# Patient Record
Sex: FEMALE | NOT HISPANIC OR LATINO | URBAN - METROPOLITAN AREA
[De-identification: names, ages, dates, MRNs, and addresses within clinical notes are randomized per-mention and may not be internally consistent; named-entity substitution may affect disease eponyms.]

---

## 2023-10-16 ENCOUNTER — LAB (OUTPATIENT)
Dept: LAB | Facility: LAB | Age: 48
End: 2023-10-16
Payer: MEDICAID

## 2023-10-16 ENCOUNTER — HOSPITAL ENCOUNTER (OUTPATIENT)
Dept: RADIOLOGY | Facility: HOSPITAL | Age: 48
Discharge: HOME | End: 2023-10-16
Payer: MEDICAID

## 2023-10-16 DIAGNOSIS — G95.9 DISEASE OF SPINAL CORD, UNSPECIFIED (MULTI): Primary | ICD-10-CM

## 2023-10-16 DIAGNOSIS — G95.9 DISEASE OF SPINAL CORD, UNSPECIFIED (MULTI): ICD-10-CM

## 2023-10-16 LAB
ERYTHROCYTE [SEDIMENTATION RATE] IN BLOOD BY WESTERGREN METHOD: 1 MM/H (ref 0–20)
EST. AVERAGE GLUCOSE BLD GHB EST-MCNC: 100 MG/DL
HBA1C MFR BLD: 5.1 %
TSH SERPL DL<=0.05 MIU/L-ACNC: 0.83 MIU/L (ref 0.27–4.2)
VIT B12 SERPL-MCNC: 353 PG/ML (ref 211–946)

## 2023-10-16 PROCEDURE — 36415 COLL VENOUS BLD VENIPUNCTURE: CPT

## 2023-10-16 PROCEDURE — 72141 MRI NECK SPINE W/O DYE: CPT

## 2023-10-16 PROCEDURE — 84207 ASSAY OF VITAMIN B-6: CPT

## 2023-10-16 PROCEDURE — 83036 HEMOGLOBIN GLYCOSYLATED A1C: CPT

## 2023-10-16 PROCEDURE — 84443 ASSAY THYROID STIM HORMONE: CPT

## 2023-10-16 PROCEDURE — 82607 VITAMIN B-12: CPT

## 2023-10-16 PROCEDURE — 85652 RBC SED RATE AUTOMATED: CPT

## 2023-10-21 LAB — PYRIDOXAL PHOS SERPL-SCNC: 28.1 NMOL/L (ref 20–125)

## 2024-02-05 ENCOUNTER — APPOINTMENT (OUTPATIENT)
Dept: NEUROSURGERY | Facility: CLINIC | Age: 49
End: 2024-02-05
Payer: COMMERCIAL

## 2024-03-15 ENCOUNTER — HOSPITAL ENCOUNTER (OUTPATIENT)
Dept: RADIOLOGY | Facility: HOSPITAL | Age: 49
Discharge: HOME | End: 2024-03-15
Payer: COMMERCIAL

## 2024-03-15 DIAGNOSIS — M47.26 OTHER SPONDYLOSIS WITH RADICULOPATHY, LUMBAR REGION: ICD-10-CM

## 2024-03-15 DIAGNOSIS — M51.26 OTHER INTERVERTEBRAL DISC DISPLACEMENT, LUMBAR REGION: ICD-10-CM

## 2024-03-15 DIAGNOSIS — M51.36 OTHER INTERVERTEBRAL DISC DEGENERATION, LUMBAR REGION: ICD-10-CM

## 2024-03-15 DIAGNOSIS — M48.07 SPINAL STENOSIS, LUMBOSACRAL REGION: ICD-10-CM

## 2024-03-15 PROCEDURE — 72148 MRI LUMBAR SPINE W/O DYE: CPT

## 2024-03-15 PROCEDURE — 72148 MRI LUMBAR SPINE W/O DYE: CPT | Performed by: RADIOLOGY

## 2024-04-22 ENCOUNTER — OFFICE VISIT (OUTPATIENT)
Dept: NEUROLOGY | Facility: CLINIC | Age: 49
End: 2024-04-22
Payer: COMMERCIAL

## 2024-04-22 VITALS
BODY MASS INDEX: 38.34 KG/M2 | SYSTOLIC BLOOD PRESSURE: 130 MMHG | HEIGHT: 68 IN | WEIGHT: 253 LBS | DIASTOLIC BLOOD PRESSURE: 86 MMHG | HEART RATE: 66 BPM

## 2024-04-22 DIAGNOSIS — R20.9 SENSORY DISTURBANCE: ICD-10-CM

## 2024-04-22 DIAGNOSIS — R26.81 GAIT INSTABILITY: ICD-10-CM

## 2024-04-22 DIAGNOSIS — G56.01 RIGHT CARPAL TUNNEL SYNDROME: ICD-10-CM

## 2024-04-22 DIAGNOSIS — G89.29 CHRONIC MIDLINE LOW BACK PAIN WITH BILATERAL SCIATICA: Primary | ICD-10-CM

## 2024-04-22 DIAGNOSIS — M54.42 CHRONIC MIDLINE LOW BACK PAIN WITH BILATERAL SCIATICA: Primary | ICD-10-CM

## 2024-04-22 DIAGNOSIS — M54.41 CHRONIC MIDLINE LOW BACK PAIN WITH BILATERAL SCIATICA: Primary | ICD-10-CM

## 2024-04-22 PROCEDURE — 99205 OFFICE O/P NEW HI 60 MIN: CPT | Performed by: PSYCHIATRY & NEUROLOGY

## 2024-04-22 RX ORDER — METOPROLOL TARTRATE 50 MG/1
50 TABLET ORAL 2 TIMES DAILY
COMMUNITY

## 2024-04-22 RX ORDER — GABAPENTIN 600 MG/1
600 TABLET ORAL 3 TIMES DAILY
COMMUNITY

## 2024-04-22 RX ORDER — TRIAMTERENE AND HYDROCHLOROTHIAZIDE 75; 50 MG/1; MG/1
1 TABLET ORAL DAILY
COMMUNITY
Start: 2018-04-11

## 2024-04-22 RX ORDER — OMEGA-3 FATTY ACIDS/FISH OIL 340-1000MG
2 CAPSULE ORAL
COMMUNITY
Start: 2022-09-06

## 2024-04-22 NOTE — PROGRESS NOTES
Subjective     Desi Lewis is a 48 y.o. year old right handed female presenting as a new patient for low back pain with bilateral sciatica, numbness and tingling in hands and feet, numbness of torso, gait/balance difficulty.    HPI    She has past medical history significant for hypertension, rheumatoid arthritis, asthma, former smoking, C4-6 ACDF in 2019, chronic low back pain.    She is evaluated in the office today as a new patient referred by her PCP Dr. Juan Shaw, she is accompanied by her son.    She presents with a number of complaints and concerns.    She has noted chronic bilateral low back pain for at least the past 8-9 years associated with radiation to both lower extremities.  Her lower extremity radicular pain tends to be worse on the left.  She indicates initially that her pain is worse when she is lying supine, but subsequently indicates that she has pain in any position.  Her pain limits her walking and standing endurance but she cannot be specific about quantifying this.    She additionally complains of numbness and tingling in all distal extremities.  This involves the fingertips of both hands without preference for particular fingers, and the toes of the left more than right foot.  It is described as constant rather than intermittent and has been particularly pronounced over the past 3 months.    Also over the past 3 months she has begun noting a persistent numbness sensation around the torso (in a band encompassing the umbilicus and in the regions above and just below).  This involves the anterior and posterior abdominal regions without side preference.    She perceives  weakness as well as weakness of her legs.  She indicates ordinarily using a walker although presents today without assistive devices.  At home she occasionally holds onto walls or furniture.    She denies bladder/bowel dysfunction except for exacerbation of back pain when she pushes to have a bowel movement.  She  does not perceive incomplete bladder emptying.    She feels a constant need to stretch her muscles which is worse when she is in bed at night.  However, stretching does not provide much relief.    In addition to radicular pain in the left lower extremity she notes burning discomfort focally over the dorsum of the left foot.    I reviewed neuroimaging.  Lumbar spine MRI from 3/15/2024 shows grade 1 anterolisthesis of L4 on L5 and slight anterolisthesis of L3 on L4, both new compared to a prior study from 2011.  There is moderate canal stenosis at L3/4 and L4/5.  There is multilevel foraminal stenosis which is severe on the right at L3/4 and L4/5, moderate to severe on the left at L3/4 and L5/S1.    Cervical spine MRI from 10/16/2023 shows status post fusion at C4-C6 with decompressed canal at those levels.  Focus of signal hyperintensity within the left lateral aspect of the cord at C4/5 presumably representing myelomalacia.  There is compression of the cord above the level of the fusion at C3/4 with subtle cord hyperintensity at this level representing myelomalacia versus edema.    I reviewed the report of EMG and nerve conduction studies done by Dr. Gio Crawley on 2/3/2021 evaluating both upper extremities.  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    With regard to pain management she is currently taking gabapentin 600 mg 3 times daily without obvious relief.  She sees a Pain Management specialist and has received injections, including a recent lumbar injection a couple of weeks ago with limited relief thus far.  She was prescribed topiramate but did not like it.  It sounds as if she has also taken oral steroid courses.    She has not been splinting recently for the right carpal tunnel syndrome.    Review of Systems    Review of Systems:  Neurologic:   As per the history of present illness.  Constitutional:  Negative for fevers, chills, or weight loss, positive for fatigue.  Musculoskeletal: Positive for neck and back pain. Cardiovascular:  Negative for chest pain or palpitations.  Respiratory:  Negative for dyspnea.  Eyes:  Negative for vision loss or diplopia.  ENT:  Negative for hearing loss, positive for tinnitus.  GI:  Negative for bowel incontinence.  :  Negative for bladder incontinence.  Dermatologic: Positive for rash.        There is no problem list on file for this patient.    Past Medical History:   Diagnosis Date    Anxiety disorder, unspecified     Anxiety     Past Surgical History:   Procedure Laterality Date    OTHER SURGICAL HISTORY  02/27/2014    Excision Lesion Of Abdominal Wall     Social History     Tobacco Use    Smoking status: Not on file    Smokeless tobacco: Not on file   Substance Use Topics    Alcohol use: Not on file     family history is not on file.  No current outpatient medications on file.  Not on File    Objective   Neurological Exam  Physical Exam    Physical Examination:    General: Alert woman who was ambulatory without assistive devices.      Cardiovascular: Warm hands with strong symmetric radial pulses.  Warm feet.    Mental Status: Clear sensorium without fluctuation.  Appropriate and oriented in conversation.  Recent and remote recall intact for details of medical history.  Attention and concentration intact during interview.  Fund of knowledge fair for medical information.  Language intact and fluent without paraphasic errors.    Cranial Nerves:  Funduscopic exam was not well visualized bilaterally on nondilated exam.  Pupils were equal, round and reactive to light with no relative afferent pupillary defect.  Extraocular movements were intact and conjugate without nystagmus.  No ptosis.  Visual fields were full to confrontation tested binocularly.  Facial sensation was symmetric to pin.  Facial motor function was  symmetrically intact.  Hearing was grossly intact.  No dysarthria.  Shoulder shrug was symmetric.  Tongue protrusion was midline.    Motor: Muscle bulk and tone were normal throughout. There was no pronator drift or asymmetry of finger taps. Confrontation strength was symmetrically 5/5 throughout the upper and lower extremities with the following exceptions: Middle deltoid and triceps were 4 right and 4+ left, and left hip flexion was 4-4+ with moderately reduced range compared to the right.     Coordination: Finger to finger and alternating hand movements were rapid and accurate without dysmetria. There was no tremor, myoclonus or dystonic posturing.    Tendon Reflexes: Symmetrically 2-3+ brachioradialis, 2+ triceps, absent patellar, 2+ ankles, neutral plantars.  Biceps was 3+ right and 2-3+ left.  Finger flexors were quite hyperactive bilaterally.    Sensation: Pin was symmetrically sharp over the volar fingertips.  Pin was sharp over the toes of the right foot, dull over the left toes.  Vibration thresholds were normal at the great toes and index fingers.  Romberg sign was absent.    Station: Intact and stable.    Gait: Stable without assistance and notable for a limping appearance, as well as question of left longer than right leg length discrepancy.  She needed to hold onto furniture or the wall to perform tandem.    Other: Lhermitte sign was absent.  Seated straight leg raise maneuver was positive bilaterally.      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    I would like her to see spinal surgery regarding the C3/4 compression sooner than her scheduled appointment which she indicates is in August.  I will contact her surgeon to see whether this can possibly be expedited.  In  the meantime I reviewed cervical spine precautions with her and discussed the risk of central cord syndrome with sudden hyperextension.    I discussed that most likely her upper extremity complaints relate primarily to cervical spondylotic myelopathy, but there may be a contribution from right median neuropathy at the wrist and I recommended she night splint.  I provided a prescription for a neutral rigid splint which I advised her to wear overnight on the right wrist.    Given her new complaint over the past few months of numbness around the torso, I recommended proceeding with a thoracic spine MRI.  I will contact her with findings.    I advised her to continue working with pain management.    She will continue using a walker as she sees fit to reduce risk of falls.  She does walk stably today without assistance albeit over a short distance.    We will determine further evaluation and management steps and timing of her next office visit after her thoracic spine MRI is completed.

## 2024-04-22 NOTE — PATIENT INSTRUCTIONS
As we discussed, I recommend an MRI of the thoracic spine to evaluate the new numbness over your torso over the past 3 months.  I will call with results.    Your most recent cervical spine MRI from October 2023 shows compression of the spinal cord at the C3/4 level which is just above your fusion.  This is likely responsible for the numbness and tingling in your hands and weakness in your hands and arms.  It may also affect your balance.  You should discuss this further with Dr. Roy, and I will contact his office to see whether your appointment can be expedited given the appearance of the cervical spine MRI.    We reviewed cervical spine precautions.  You should be careful to keep the headrest when you are driving or riding in a car, in a position that protects you from your head whipping back should the car stop suddenly or be hit from behind.  You should avoid activities that put you at risk for falling with head trauma, such as climbing on ladders.    I recommend you continue working with pain management on symptom control.  Since you had a recent injection, we discussed contacting your pain management specialist about whether a second injection may provide additional benefit.    Continue using a walker or cane as you see fit to reduce risk of falls.    We will determine timing of your next office visit after the thoracic spine MRI is completed.

## 2024-05-07 ENCOUNTER — OFFICE VISIT (OUTPATIENT)
Dept: NEUROSURGERY | Facility: CLINIC | Age: 49
End: 2024-05-07
Payer: COMMERCIAL

## 2024-05-07 VITALS
WEIGHT: 250 LBS | BODY MASS INDEX: 37.89 KG/M2 | RESPIRATION RATE: 20 BRPM | HEIGHT: 68 IN | SYSTOLIC BLOOD PRESSURE: 122 MMHG | HEART RATE: 78 BPM | DIASTOLIC BLOOD PRESSURE: 70 MMHG

## 2024-05-07 DIAGNOSIS — M48.061 SPINAL STENOSIS OF LUMBAR REGION WITHOUT NEUROGENIC CLAUDICATION: Primary | ICD-10-CM

## 2024-05-07 PROCEDURE — 99212 OFFICE O/P EST SF 10 MIN: CPT | Performed by: NEUROLOGICAL SURGERY

## 2024-05-07 ASSESSMENT — ENCOUNTER SYMPTOMS
NUMBNESS: 1
HEMATOLOGIC/LYMPHATIC NEGATIVE: 1
FATIGUE: 1
PSYCHIATRIC NEGATIVE: 1
GASTROINTESTINAL NEGATIVE: 1
RESPIRATORY NEGATIVE: 1
ACTIVITY CHANGE: 1
DIZZINESS: 1
LIGHT-HEADEDNESS: 1
WEAKNESS: 1
NECK PAIN: 1
BACK PAIN: 1
ENDOCRINE NEGATIVE: 1

## 2024-05-07 ASSESSMENT — PAIN SCALES - GENERAL: PAINLEVEL: 8

## 2024-05-07 NOTE — PROGRESS NOTES
"9-19-19 C4-6 ACDF. Here today with ache pain in the neck. Having burning stabbing pain in the back and down left greater than right leg and numbness and tingling. Has had injection, no PT.    48-year-old woman who previously underwent anterior cervical discectomy and fusion with donor bone and plating returns because of severe low back pain that is radiating down her left leg.  She has not had she any relief from injections or medications.  The pain radiates from her back down her left leg all the way to her foot.    Review of Systems   Constitutional:  Positive for activity change and fatigue.   HENT: Negative.     Eyes:  Positive for visual disturbance.   Respiratory: Negative.     Cardiovascular:  Positive for leg swelling.   Gastrointestinal: Negative.    Endocrine: Negative.    Genitourinary: Negative.    Musculoskeletal:  Positive for back pain and neck pain.   Skin: Negative.    Allergic/Immunologic: Positive for environmental allergies and food allergies.   Neurological:  Positive for dizziness, weakness, light-headedness and numbness.   Hematological: Negative.    Psychiatric/Behavioral: Negative.         Visit Vitals  /70   Pulse 78   Resp 20   Ht 1.727 m (5' 8\")   Wt 113 kg (250 lb)   BMI 38.01 kg/m²   Smoking Status Former   BSA 2.33 m²           Current Outpatient Medications:     ALBUTEROL INHL, Albuterol, Disp: , Rfl:     gabapentin (Neurontin) 600 mg tablet, Take 1 tablet (600 mg) by mouth 3 times a day., Disp: , Rfl:     metoprolol tartrate (Lopressor) 50 mg tablet, Take 1 tablet by mouth 2 times a day., Disp: , Rfl:     omega-3 fatty acids-fish oil (Fish OiL) 340-1,000 mg capsule, Take 2 capsules by mouth once daily., Disp: , Rfl:     triamterene-hydrochlorothiazid (Maxzide) 75-50 mg tablet, Take 1 tablet by mouth once daily., Disp: , Rfl:       Objective   Neurological Exam    On physical exam, the patient is alert and interactive.  Her spine is nontender.  Strength is full throughout.  Her " reflexes are hypoactive throughout.    An MRI of the lumbar spine that I personally reviewed demonstrates multilevel degenerative changes.  At L3-4 there is a bulging disc that results in some bilateral foraminal stenosis.  At L4-5 there is a grade 1 spondylolisthesis and moderate to severe canal stenosis.  At L5-S1 there is a left greater than right disc bulge with left lateral recess stenosis.  In her cervical spine, post operative changes are seen at the C4-6 levels.  There is a broad-based disc bulge at C3-4 that results in moderate canal stenosis and early deformation of the spinal cord.    The patient has severe spondylosis with spondylolisthesis in her lumbar spine.  She has not had relief from nonoperative therapy.  To address this, I have offered her an L4-S1 decompressive laminectomy and fusion with rods, screws, and donor bone.  We reviewed the risks and benefits of the procedure today.  Specifically, we discussed that surgery would be a last resort for severe, debilitating pain that is not treatable by any nonoperative means.  The patient will contact my office when she makes a decision about surgery.

## 2024-07-12 ENCOUNTER — EVALUATION (OUTPATIENT)
Dept: PHYSICAL THERAPY | Facility: CLINIC | Age: 49
End: 2024-07-12
Payer: COMMERCIAL

## 2024-07-12 DIAGNOSIS — M48.061 SPINAL STENOSIS OF LUMBAR REGION WITHOUT NEUROGENIC CLAUDICATION: ICD-10-CM

## 2024-07-12 DIAGNOSIS — M54.50 LOW BACK PAIN, UNSPECIFIED BACK PAIN LATERALITY, UNSPECIFIED CHRONICITY, UNSPECIFIED WHETHER SCIATICA PRESENT: Primary | ICD-10-CM

## 2024-07-12 DIAGNOSIS — M51.26 LUMBAR DISC HERNIATION: ICD-10-CM

## 2024-07-12 PROCEDURE — 97162 PT EVAL MOD COMPLEX 30 MIN: CPT | Mod: GP | Performed by: PHYSICAL THERAPIST

## 2024-07-12 SDOH — ECONOMIC STABILITY: GENERAL: QUALITY OF LIFE: FAIR

## 2024-07-12 ASSESSMENT — ENCOUNTER SYMPTOMS
PAIN LOCATION: LUMBAR SPINE
ALLEVIATING FACTORS: HEAT
PAIN SCALE AT LOWEST: 6
PAIN AT NIGHT: 1
QUALITY: BURNING
PAIN SCALE: 8
EXACERBATED BY: MOVEMENT
PAIN SCALE AT HIGHEST: 10

## 2024-07-12 ASSESSMENT — ACTIVITIES OF DAILY LIVING (ADL)
AMBULATION_WITHOUT_ASSISTIVE_DEVICE: INDEPENDENT
POOR_BALANCE: 1

## 2024-07-12 NOTE — PROGRESS NOTES
"Physical Therapy Evaluation and Treatment     Patient Name: Desi Lewis  MRN: [id number]  Visit Date: 2024  Time Calculation  Start Time: 09  Stop Time: 1010  Time Calculation (min): 35 min  PT Evaluation Time Entry  PT Evaluation (Moderate) Time Entry: 35    Visit # 1 of 9  Visits/Dates Authorized: NO AUTH / 60 V/yr - 0 USED / $30 COPAY / OOP $3000 - $856 met     Current Problem:   Problem List Items Addressed This Visit             ICD-10-CM    Spinal stenosis of lumbar region without neurogenic claudication M48.061    Relevant Orders    Follow Up In Physical Therapy    Lumbar disc herniation M51.26    Relevant Orders    Follow Up In Physical Therapy    Low back pain - Primary M54.50    Relevant Orders    Follow Up In Physical Therapy     Precautions:  Precautions  Precautions Comment: See MRI results    Subjective Evaluation    History of Present Illness  Mechanism of injury: Pt reports to therapy with LBP and would like to try somethign else besides surgery. She reports reduced mobiltiy and has had a couple falls. Her most recent fall was about 2 months ago which exacerbated the back pain; she reports she was walking her dog and \"just lost her balance.\" Difficulty with sleep, wakes her up at night     Quality of life: fair    Pain  Current pain ratin  At best pain ratin  At worst pain rating: 10  Location: Lumbar spine  Quality: burning  Relieving factors: heat  Aggravating factors: movement  Progression: worsening    Patient Goals  Patient goals for therapy: decreased pain, improved balance, increased strength, increased motion and independence with ADLs/IADLs           Objective     Special Questions  Patient is experiencing night pain.     Static Posture   General Observations  Symmetrical weight bearing and guarded.     Lumbar Spine   Flattened.     Postural Observations  Seated posture: fair  Standing posture: fair      Palpation   Left   Muscle spasm in the iliopsoas and lumbar " paraspinals.   Tenderness of the iliopsoas and lumbar paraspinals.   Trigger point to iliopsoas and lumbar paraspinals.     Right   Muscle spasm in the lumbar paraspinals. Tenderness of the iliopsoas.   Trigger point to lumbar paraspinals.     Additional Palpation Details  Large amount of guarding throughout lumbar spine musculature    Active Range of Motion     Lumbar   Normal active range of motion  Flexion: WFL and with pain  Extension: WFL and with pain  Left lateral flexion: WFL and with pain  Right lateral flexion: WFL and with pain    Strength/Myotome Testing     Left Hip   Planes of Motion   Flexion: 4+  Extension: 4+  Abduction: 4+  Adduction: 4+    Right Hip   Planes of Motion   Flexion: 4  Abduction: 4+  Adduction: 4+    Additional Strength Details  Screened in sitting - pt unable to achieve supine position     Ambulation     Ambulation: Level Surfaces   Ambulation without assistive device: independent    Ambulation: Stairs   Ascend stairs: independent  Pattern: reciprocal  Railings: one rail  Descend stairs: independent  Pattern: non-reciprocal  Railings: two rails    Observational Gait   Gait: antalgic   Walking speed, stride length, left stance time and right stance time within functional limits.   Base of support: normal    Additional Observational Gait Details  Slight trendelenburg        Objective     Special Questions  Patient is experiencing night pain.     Static Posture   General Observations  Symmetrical weight bearing and guarded.     Lumbar Spine   Flattened.     Postural Observations  Seated posture: fair  Standing posture: fair      Palpation   Left   Muscle spasm in the iliopsoas and lumbar paraspinals.   Tenderness of the iliopsoas and lumbar paraspinals.   Trigger point to iliopsoas and lumbar paraspinals.     Right   Muscle spasm in the lumbar paraspinals. Tenderness of the iliopsoas.   Trigger point to lumbar paraspinals.     Additional Palpation Details  Large amount of guarding  throughout lumbar spine musculature    Active Range of Motion     Lumbar   Normal active range of motion  Flexion: WFL and with pain  Extension: WFL and with pain  Left lateral flexion: WFL and with pain  Right lateral flexion: WFL and with pain    Strength/Myotome Testing     Left Hip   Planes of Motion   Flexion: 4+  Extension: 4+  Abduction: 4+  Adduction: 4+    Right Hip   Planes of Motion   Flexion: 4  Abduction: 4+  Adduction: 4+    Additional Strength Details  Screened in sitting - pt unable to achieve supine position     Ambulation     Ambulation: Level Surfaces   Ambulation without assistive device: independent    Ambulation: Stairs   Ascend stairs: independent  Pattern: reciprocal  Railings: one rail  Descend stairs: independent  Pattern: non-reciprocal  Railings: two rails    Observational Gait   Gait: antalgic   Walking speed, stride length, left stance time and right stance time within functional limits.   Base of support: normal    Additional Observational Gait Details  Slight trendelenburg        Objective     Special Questions  Patient is experiencing night pain.     Static Posture   General Observations  Symmetrical weight bearing and guarded.     Lumbar Spine   Flattened.     Postural Observations  Seated posture: fair  Standing posture: fair      Palpation   Left   Muscle spasm in the iliopsoas and lumbar paraspinals.   Tenderness of the iliopsoas and lumbar paraspinals.   Trigger point to iliopsoas and lumbar paraspinals.     Right   Muscle spasm in the lumbar paraspinals. Tenderness of the iliopsoas.   Trigger point to lumbar paraspinals.     Additional Palpation Details  Large amount of guarding throughout lumbar spine musculature    Active Range of Motion     Lumbar   Normal active range of motion  Flexion: WFL and with pain  Extension: WFL and with pain  Left lateral flexion: WFL and with pain  Right lateral flexion: WFL and with pain    Strength/Myotome Testing     Left Hip   Planes of  Motion   Flexion: 4+  Extension: 4+  Abduction: 4+  Adduction: 4+    Right Hip   Planes of Motion   Flexion: 4  Abduction: 4+  Adduction: 4+    Additional Strength Details  Screened in sitting - pt unable to achieve supine position     Ambulation     Ambulation: Level Surfaces   Ambulation without assistive device: independent    Ambulation: Stairs   Ascend stairs: independent  Pattern: reciprocal  Railings: one rail  Descend stairs: independent  Pattern: non-reciprocal  Railings: two rails    Observational Gait   Gait: antalgic   Walking speed, stride length, left stance time and right stance time within functional limits.   Base of support: normal    Additional Observational Gait Details  Slight trendelenburg      Treatments:  Therapeutic Exercise  Therapeutic Exercise Performed: Yes  Therapeutic Exercise Activity 1: Access Code [email or web address]  Therapeutic Activity  Therapeutic Activity Performed: Yes  Therapeutic Activity 1: Educated on anatomy in relation to current findings  - Seated Diaphragmatic Breathing  - 1-2 x daily - 7 x weekly - 3 sets - 10 reps  - Seated Thoracic Lumbar Extension  - 1-2 x daily - 7 x weekly - 3 sets - 10 reps  - Standing Thoracic Extension at Wall  - 1-2 x daily - 7 x weekly - 3 sets - 10 reps  - Prone Press Up On Elbows  - 1-2 x daily - 7 x weekly - 3 sets - 10 reps      Assessment & Plan     Assessment  Impairments: abnormal gait, abnormal or restricted ROM, activity intolerance, impaired balance, impaired physical strength, lacks appropriate home exercise program and pain with function  Assessment details: Pt is a 48 y.o female presenting to therapy with LBP acute reoccurrence. Pt demonstrated grossly normal limits of mobility however pain throughout lumbar ROM due to muscle guarding and pain. Tenderness and restriction throughout B lumbar appears to be greater L > R. Pt appears to have potential both disc and lumbar muscle involvement which could both be creating tension on neural  structures and reported symptoms. At this time pt would benefit from skilled physical therapy in order to prevent further functional decline.   Barriers to therapy: Chronic nature  Prognosis: good  Prognosis details: Pt very willing to prevent surgery and try conservative methods    Plan  Therapy options: will be seen for skilled physical therapy services  Planned modality interventions: cryotherapy, electrical stimulation/Russian stimulation, TENS and traction  Planned therapy interventions: abdominal trunk stabilization, manual therapy, motor coordination training, balance/weight-bearing training, bed mobility training, neuromuscular re-education, body mechanics training, postural training, soft tissue mobilization, spinal/joint mobilization, flexibility, functional ROM exercises, strengthening, stretching, gait training, therapeutic activities, home exercise program, transfer training and joint mobilization  Other planned therapy interventions: Dry Needling  Frequency: 1x week  Duration in visits: 8  Duration in weeks: 8  Treatment plan discussed with: patient  Plan details: Extension vs flexion based lumbar spine        Moderate complexity due to patient's clinical presentation being evolving with changing characteristics, with comorbidities/complexities to include falls, chronic pain, radicular symptoms, all of which may negatively impact rehab tolerance and progression.     Post-Treatment Pain:   No change     Goals:   Active       PT Problem       PT Goal 1       Start:  07/12/24    Expected End:  08/16/24       Pt will be 50% IND with HEP in 4 weeks in order to progress with therapy.          PT Goal 2       Start:  07/12/24    Expected End:  08/16/24       Pt will reduce pain levels to no more than 5/10 in 4 weeks in order to improve sleep, self care and work tasks.           PT Goal 3       Start:  07/12/24    Expected End:  08/16/24       Pt will demonstrate full lumbar ROM with minimal pain in 4 weeks to  improve transfers and ambulation.          PT Goal 4       Start:  07/12/24    Expected End:  08/16/24       Pt will demonstrate subjective improvement of ADLs and recreational activities through improved score of 20 on SHANTAL in 4 weeks for sleep.             PT Problem       PT Goal 1       Start:  07/12/24    Expected End:  09/30/24       Pt will be 100% IND with HEP in 8 weeks in order to maintain progress with therapy.           PT Goal 2       Start:  07/12/24    Expected End:  09/30/24       Pt will reduce pain levels to no more than 2/10 in 8 weeks in order to improve sleep, transfers and ambulation.          PT Goal 3       Start:  07/12/24    Expected End:  09/30/24       Pt will demonstrate subjective improvement of ADLs and recreational activities through improved score of 10 on SHANTAL in 8 weeks for full self care and work tasks.          PT Goal 4       Start:  07/12/24    Expected End:  09/30/24       Pt will be able to walk her dog without any LOB and increase pain in 8 weeks.          PT Goal 5       Start:  07/12/24    Expected End:  09/30/24       Pt will demonstrate normalize transfers, stair negotiation and ambulation in 8 weeks without any antalgic movement for normalize functional mobility.

## 2024-08-02 ENCOUNTER — APPOINTMENT (OUTPATIENT)
Dept: PHYSICAL THERAPY | Facility: CLINIC | Age: 49
End: 2024-08-02
Payer: COMMERCIAL

## 2024-08-09 ENCOUNTER — TREATMENT (OUTPATIENT)
Dept: PHYSICAL THERAPY | Facility: CLINIC | Age: 49
End: 2024-08-09
Payer: COMMERCIAL

## 2024-08-09 ENCOUNTER — APPOINTMENT (OUTPATIENT)
Dept: NEUROLOGY | Facility: CLINIC | Age: 49
End: 2024-08-09
Payer: COMMERCIAL

## 2024-08-09 DIAGNOSIS — M51.26 LUMBAR DISC HERNIATION: ICD-10-CM

## 2024-08-09 DIAGNOSIS — M54.50 LOW BACK PAIN, UNSPECIFIED BACK PAIN LATERALITY, UNSPECIFIED CHRONICITY, UNSPECIFIED WHETHER SCIATICA PRESENT: ICD-10-CM

## 2024-08-09 DIAGNOSIS — M48.061 SPINAL STENOSIS OF LUMBAR REGION WITHOUT NEUROGENIC CLAUDICATION: ICD-10-CM

## 2024-08-09 PROCEDURE — 97012 MECHANICAL TRACTION THERAPY: CPT | Mod: GP,CQ

## 2024-08-09 PROCEDURE — 97110 THERAPEUTIC EXERCISES: CPT | Mod: GP,CQ

## 2024-08-09 NOTE — PROGRESS NOTES
Physical Therapy Treatment    Patient Name: Desi Lewis  MRN: [id number]  Today's Date: 8/9/2024  Time Calculation  Start Time: 1115  Stop Time: 1200  Time Calculation (min): 45 min  PT Modalities Time Entry  Mechanical Traction Time Entry: 20  PT Therapeutic Procedures Time Entry  Therapeutic Exercise Time Entry: 25    Insurance:  Visit number: 2 of 9  Authorization info: ANTH AQT - NO AUTH / 60 V/yr - 0 USED / $30 COPAY / OOP $3000 - $856 met / AVAILITY [id number] / ds 7/10/24.  Insurance Type: Payor: [employer] / Plan: ANTH HMP / Product Type: *No Product type* /     Current Problem   1. Spinal stenosis of lumbar region without neurogenic claudication  Follow Up In Physical Therapy      2. Low back pain, unspecified back pain laterality, unspecified chronicity, unspecified whether sciatica present  Follow Up In Physical Therapy      3. Lumbar disc herniation  Follow Up In Physical Therapy          Subjective   General    Pt reports that she is the same.   Precautions:   None  Pain    7  Post Treatment Pain Level same    Objective   Weak core stabilization noted    Treatments:  Therapeutic Exercise:   Abdominal bracing  Bridges  Abdominal bracing with MIP  LTR       Modalities  Mechanical Lumbar Traction: Intermittent: 65 lbs and 45 seconds on, 40 lbs and 20 seconds off, for 15 minutes in Supine, with legs 90/90     Assessment   Assessment:    Pt had some difficulty with ther ex because of high pain levels    Plan:    Continue as able. Assess affects     OP EDUCATION:   Revised HEP    Goals:   Active       PT Problem       PT Goal 1       Start:  07/12/24    Expected End:  08/16/24       Pt will be 50% IND with HEP in 4 weeks in order to progress with therapy.          PT Goal 2       Start:  07/12/24    Expected End:  08/16/24       Pt will reduce pain levels to no more than 5/10 in 4 weeks in order to improve sleep, self care and work tasks.           PT Goal 3       Start:  07/12/24    Expected End:  08/16/24        Pt will demonstrate full lumbar ROM with minimal pain in 4 weeks to improve transfers and ambulation.          PT Goal 4       Start:  07/12/24    Expected End:  08/16/24       Pt will demonstrate subjective improvement of ADLs and recreational activities through improved score of 20 on SHANTAL in 4 weeks for sleep.             PT Problem       PT Goal 1       Start:  07/12/24    Expected End:  09/30/24       Pt will be 100% IND with HEP in 8 weeks in order to maintain progress with therapy.           PT Goal 2       Start:  07/12/24    Expected End:  09/30/24       Pt will reduce pain levels to no more than 2/10 in 8 weeks in order to improve sleep, transfers and ambulation.          PT Goal 3       Start:  07/12/24    Expected End:  09/30/24       Pt will demonstrate subjective improvement of ADLs and recreational activities through improved score of 10 on SHANTAL in 8 weeks for full self care and work tasks.          PT Goal 4       Start:  07/12/24    Expected End:  09/30/24       Pt will be able to walk her dog without any LOB and increase pain in 8 weeks.          PT Goal 5       Start:  07/12/24    Expected End:  09/30/24       Pt will demonstrate normalize transfers, stair negotiation and ambulation in 8 weeks without any antalgic movement for normalize functional mobility.           Active

## 2024-08-16 ENCOUNTER — APPOINTMENT (OUTPATIENT)
Dept: PHYSICAL THERAPY | Facility: CLINIC | Age: 49
End: 2024-08-16
Payer: COMMERCIAL

## 2024-08-23 ENCOUNTER — APPOINTMENT (OUTPATIENT)
Dept: PHYSICAL THERAPY | Facility: CLINIC | Age: 49
End: 2024-08-23
Payer: COMMERCIAL

## 2024-08-23 ENCOUNTER — TREATMENT (OUTPATIENT)
Dept: PHYSICAL THERAPY | Facility: CLINIC | Age: 49
End: 2024-08-23
Payer: COMMERCIAL

## 2024-08-23 DIAGNOSIS — M51.26 LUMBAR DISC HERNIATION: ICD-10-CM

## 2024-08-23 DIAGNOSIS — M48.061 SPINAL STENOSIS OF LUMBAR REGION WITHOUT NEUROGENIC CLAUDICATION: ICD-10-CM

## 2024-08-23 DIAGNOSIS — M54.50 LOW BACK PAIN, UNSPECIFIED BACK PAIN LATERALITY, UNSPECIFIED CHRONICITY, UNSPECIFIED WHETHER SCIATICA PRESENT: ICD-10-CM

## 2024-08-23 PROCEDURE — 97012 MECHANICAL TRACTION THERAPY: CPT | Mod: GP,CQ

## 2024-08-23 PROCEDURE — 97110 THERAPEUTIC EXERCISES: CPT | Mod: GP,CQ

## 2024-08-23 ASSESSMENT — PAIN DESCRIPTION - DESCRIPTORS: DESCRIPTORS: BURNING

## 2024-08-23 ASSESSMENT — PAIN SCALES - GENERAL: PAINLEVEL_OUTOF10: 6

## 2024-08-23 NOTE — PROGRESS NOTES
Physical Therapy Treatment    Patient Name: Desi Lewis  MRN: [id number]  Today's Date: 8/23/2024  Time Calculation  Start Time: 1316  Stop Time: 1415  Time Calculation (min): 59 min  PT Modalities Time Entry  Hot/Cold Pack Time Entry: 15  Mechanical Traction Time Entry: 15  PT Therapeutic Procedures Time Entry  Therapeutic Exercise Time Entry: 40    Insurance:  Visit number: 3 of 9  Authorization info: ANTH AQT - NO AUTH / 60 V/yr - 0 USED / $30 COPAY / OOP $3000 - $856 met / AVAILITY [id number] / ds 7/10/24.  Insurance Type: Payor: ANTHEM / Plan: ANTHEM HMP / Product Type: *No Product type* /     Current Problem   1. Spinal stenosis of lumbar region without neurogenic claudication  Follow Up In Physical Therapy      2. Low back pain, unspecified back pain laterality, unspecified chronicity, unspecified whether sciatica present  Follow Up In Physical Therapy      3. Lumbar disc herniation  Follow Up In Physical Therapy          Subjective   Pt reports mod/high pain level @ LB on arrival.    General   Reason for Referral: LBP  Referred By: Dr. Roy  Precautions:  Precautions  Precautions Comment: See MRI results  Pain   0-10 (Numeric) Pain Score: 6  Pain Type: Chronic pain  Pain Location: Back  Pain Orientation: Lower  Pain Descriptors: Burning  Post Treatment Pain Level 6/10    Objective   Antalgic gait on arrival, performed ther ex seated with lumbar support    Treatments:  Therapeutic Exercise:  Therapeutic Exercise  Therapeutic Exercise Performed: Yes  Therapeutic Exercise Activity 1: [id number]  Therapeutic Exercise Activity 2: Seated with lumbar support v4'  Therapeutic Exercise Activity 3: Isometric abdominal brace 2x10  Therapeutic Exercise Activity 4: Isometric abdominal brace with pelvic floor 2x10  Therapeutic Exercise Activity 5: Isometric abdominal brace with hip adduction 2x10  Therapeutic Exercise Activity 6: Isometric abdominal brace with hip abduction, PTB, 2x10  Therapeutic Exercise Activity 7:  Isometric abdominal brace with MIP 2x10  Therapeutic Exercise Activity 8: Seated hamstring stretches  Therapeutic Exercise Activity 9: Discussed use and importance of core musculature in support and protection of spine    Modalities  Mechanical Lumbar Traction: Intermittent: 65 lbs and 60 seconds on, 45 lbs and 15 seconds off, for 15 minutes in Supine, with legs 90/90  Moist heat pack applied to Back during traction      Assessment   Assessment:   PT Assessment  PT Assessment Results:  (abnormal gait, abnormal or restricted ROM, activity intolerance, impaired balance, impaired physical strength, lacks appropriate home exercise program and pain with function)  Rehab Prognosis: Good  Evaluation/Treatment Tolerance: Patient tolerated treatment well, Patient limited by pain    Plan:   OP PT Plan  Treatment/Interventions: Hot pack, Gait training, Manual therapy, Mechanical traction, Neuromuscular re-education, Therapeutic activities, Therapeutic exercises, Electrical stimulation, Cryotherapy, Dry needling  PT Plan: Skilled PT  PT Frequency: 1 time per week  Duration: 8 weeks  Number of Treatments Authorized: 60/year  Rehab Potential: Good  Plan of Care Agreement: Patient  Consider standing extension with wall support next visit.    OP EDUCATION:  Outpatient Education  Individual(s) Educated: Patient  Education Provided: Anatomy, Body Mechanics, Home Exercise Program  Patient/Caregiver Demonstrated Understanding: yes  Patient Response to Education: Patient/Caregiver Verbalized Understanding of Information, Patient/Caregiver Performed Return Demonstration of Exercises/Activities, Patient/Caregiver Asked Appropriate Questions    Goals:   Active       PT Problem       PT Goal 1       Start:  07/12/24    Expected End:  08/16/24       Pt will be 50% IND with HEP in 4 weeks in order to progress with therapy.          PT Goal 2       Start:  07/12/24    Expected End:  08/16/24       Pt will reduce pain levels to no more than  5/10 in 4 weeks in order to improve sleep, self care and work tasks.           PT Goal 3       Start:  07/12/24    Expected End:  08/16/24       Pt will demonstrate full lumbar ROM with minimal pain in 4 weeks to improve transfers and ambulation.          PT Goal 4       Start:  07/12/24    Expected End:  08/16/24       Pt will demonstrate subjective improvement of ADLs and recreational activities through improved score of 20 on SHANTAL in 4 weeks for sleep.             PT Problem       PT Goal 1       Start:  07/12/24    Expected End:  09/30/24       Pt will be 100% IND with HEP in 8 weeks in order to maintain progress with therapy.           PT Goal 2       Start:  07/12/24    Expected End:  09/30/24       Pt will reduce pain levels to no more than 2/10 in 8 weeks in order to improve sleep, transfers and ambulation.          PT Goal 3       Start:  07/12/24    Expected End:  09/30/24       Pt will demonstrate subjective improvement of ADLs and recreational activities through improved score of 10 on SHANTAL in 8 weeks for full self care and work tasks.          PT Goal 4       Start:  07/12/24    Expected End:  09/30/24       Pt will be able to walk her dog without any LOB and increase pain in 8 weeks.          PT Goal 5       Start:  07/12/24    Expected End:  09/30/24       Pt will demonstrate normalize transfers, stair negotiation and ambulation in 8 weeks without any antalgic movement for normalize functional mobility.

## 2024-08-30 ENCOUNTER — APPOINTMENT (OUTPATIENT)
Dept: PHYSICAL THERAPY | Facility: CLINIC | Age: 49
End: 2024-08-30
Payer: COMMERCIAL

## 2024-08-30 ENCOUNTER — DOCUMENTATION (OUTPATIENT)
Dept: PHYSICAL THERAPY | Facility: CLINIC | Age: 49
End: 2024-08-30
Payer: COMMERCIAL

## 2024-08-30 NOTE — PROGRESS NOTES
Physical Therapy                 Therapy Communication Note    Patient Name: Desi Lewis  MRN: [id number]  Today's Date: 8/30/2024     Discipline: Physical Therapy    Missed Visit Reason:  Sick     Missed Time: Cancel    Comment: Pt called in to cx appointment this date due to illness.

## 2024-09-06 ENCOUNTER — APPOINTMENT (OUTPATIENT)
Dept: PHYSICAL THERAPY | Facility: CLINIC | Age: 49
End: 2024-09-06
Payer: COMMERCIAL

## 2024-09-13 ENCOUNTER — APPOINTMENT (OUTPATIENT)
Dept: PHYSICAL THERAPY | Facility: CLINIC | Age: 49
End: 2024-09-13
Payer: COMMERCIAL

## 2024-09-20 ENCOUNTER — APPOINTMENT (OUTPATIENT)
Dept: PHYSICAL THERAPY | Facility: CLINIC | Age: 49
End: 2024-09-20
Payer: COMMERCIAL

## 2024-09-27 ENCOUNTER — DOCUMENTATION (OUTPATIENT)
Dept: PHYSICAL THERAPY | Facility: CLINIC | Age: 49
End: 2024-09-27
Payer: COMMERCIAL

## 2024-09-27 NOTE — PROGRESS NOTES
Physical Therapy    Discharge Summary    Name: Desi Lewis  MRN: [id number]  : 1975  Date: 24    Discharge Summary: PT    Discharge Information: Date of discharge 24, Date of last visit 24, and Date of evaluation 24    Therapy Summary: Pt only attended two follow up sessions with minimal progression, failed to continue with follow up session.     Discharge Status: not met     Rehab Discharge Reason: Failed to schedule and/or keep follow-up appointment(s)